# Patient Record
Sex: MALE | Race: WHITE | NOT HISPANIC OR LATINO | Employment: OTHER | URBAN - NONMETROPOLITAN AREA
[De-identification: names, ages, dates, MRNs, and addresses within clinical notes are randomized per-mention and may not be internally consistent; named-entity substitution may affect disease eponyms.]

---

## 2024-02-20 ENCOUNTER — OFFICE VISIT (OUTPATIENT)
Dept: PRIMARY CARE | Facility: CLINIC | Age: 69
End: 2024-02-20
Payer: MEDICARE

## 2024-02-20 VITALS
DIASTOLIC BLOOD PRESSURE: 80 MMHG | OXYGEN SATURATION: 98 % | HEIGHT: 68 IN | HEART RATE: 58 BPM | WEIGHT: 176.5 LBS | SYSTOLIC BLOOD PRESSURE: 120 MMHG | BODY MASS INDEX: 26.75 KG/M2

## 2024-02-20 DIAGNOSIS — I63.81 LACUNAR STROKE (MULTI): ICD-10-CM

## 2024-02-20 DIAGNOSIS — Z12.5 SCREENING FOR PROSTATE CANCER: ICD-10-CM

## 2024-02-20 DIAGNOSIS — I10 MILD HTN: ICD-10-CM

## 2024-02-20 DIAGNOSIS — E78.2 HYPERLIPEMIA, MIXED: ICD-10-CM

## 2024-02-20 DIAGNOSIS — Z00.00 ROUTINE GENERAL MEDICAL EXAMINATION AT HEALTH CARE FACILITY: Primary | ICD-10-CM

## 2024-02-20 PROCEDURE — 3079F DIAST BP 80-89 MM HG: CPT | Performed by: FAMILY MEDICINE

## 2024-02-20 PROCEDURE — 1159F MED LIST DOCD IN RCRD: CPT | Performed by: FAMILY MEDICINE

## 2024-02-20 PROCEDURE — 1170F FXNL STATUS ASSESSED: CPT | Performed by: FAMILY MEDICINE

## 2024-02-20 PROCEDURE — 99213 OFFICE O/P EST LOW 20 MIN: CPT | Performed by: FAMILY MEDICINE

## 2024-02-20 PROCEDURE — 1160F RVW MEDS BY RX/DR IN RCRD: CPT | Performed by: FAMILY MEDICINE

## 2024-02-20 PROCEDURE — G0439 PPPS, SUBSEQ VISIT: HCPCS | Performed by: FAMILY MEDICINE

## 2024-02-20 PROCEDURE — 3074F SYST BP LT 130 MM HG: CPT | Performed by: FAMILY MEDICINE

## 2024-02-20 PROCEDURE — 4004F PT TOBACCO SCREEN RCVD TLK: CPT | Performed by: FAMILY MEDICINE

## 2024-02-20 RX ORDER — LATANOPROST 50 UG/ML
1 SOLUTION/ DROPS OPHTHALMIC NIGHTLY
COMMUNITY
Start: 2024-02-16

## 2024-02-20 RX ORDER — LISINOPRIL 10 MG/1
10 TABLET ORAL DAILY
COMMUNITY
End: 2024-02-20 | Stop reason: SINTOL

## 2024-02-20 RX ORDER — LOSARTAN POTASSIUM 50 MG/1
50 TABLET ORAL DAILY
Qty: 90 TABLET | Refills: 3 | Status: SHIPPED | OUTPATIENT
Start: 2024-02-20 | End: 2025-02-19

## 2024-02-20 RX ORDER — ROSUVASTATIN CALCIUM 5 MG/1
5 TABLET, COATED ORAL DAILY
Qty: 90 TABLET | Refills: 3 | Status: SHIPPED | OUTPATIENT
Start: 2024-02-20 | End: 2025-02-19

## 2024-02-20 RX ORDER — DORZOLAMIDE HYDROCHLORIDE AND TIMOLOL MALEATE 20; 5 MG/ML; MG/ML
1 SOLUTION/ DROPS OPHTHALMIC 2 TIMES DAILY
COMMUNITY
Start: 2024-02-17

## 2024-02-20 ASSESSMENT — ENCOUNTER SYMPTOMS
LIGHT-HEADEDNESS: 0
ACTIVITY CHANGE: 0
NAUSEA: 0
ABDOMINAL DISTENTION: 0
COUGH: 0
APPETITE CHANGE: 0
CONSTIPATION: 0
PALPITATIONS: 0
NUMBNESS: 0
ARTHRALGIAS: 0
DIZZINESS: 0
ABDOMINAL PAIN: 0
OCCASIONAL FEELINGS OF UNSTEADINESS: 0
RHINORRHEA: 0
DEPRESSION: 0
VOMITING: 0
DIFFICULTY URINATING: 0
DIARRHEA: 0
UNEXPECTED WEIGHT CHANGE: 0
WHEEZING: 0
TROUBLE SWALLOWING: 0
SHORTNESS OF BREATH: 0
ADENOPATHY: 0
FATIGUE: 0
HEADACHES: 0
LOSS OF SENSATION IN FEET: 0

## 2024-02-20 ASSESSMENT — PATIENT HEALTH QUESTIONNAIRE - PHQ9
1. LITTLE INTEREST OR PLEASURE IN DOING THINGS: NOT AT ALL
SUM OF ALL RESPONSES TO PHQ9 QUESTIONS 1 AND 2: 0
2. FEELING DOWN, DEPRESSED OR HOPELESS: NOT AT ALL

## 2024-02-20 ASSESSMENT — ACTIVITIES OF DAILY LIVING (ADL)
GROCERY_SHOPPING: INDEPENDENT
TAKING_MEDICATION: INDEPENDENT
MANAGING_FINANCES: INDEPENDENT
BATHING: INDEPENDENT
DOING_HOUSEWORK: INDEPENDENT
DRESSING: INDEPENDENT

## 2024-02-20 NOTE — ASSESSMENT & PLAN NOTE
Blood pressure stable, and having issues with a dry cough, try changing lisinopril to losartan 50 mg once a day recheck again in 1 month.

## 2024-02-20 NOTE — ASSESSMENT & PLAN NOTE
Need to be taking a statin due to history of TIA, try rosuvastatin 5 mg once a day, was on 20 mg atorvastatin before, patient self discontinued.

## 2024-02-20 NOTE — ASSESSMENT & PLAN NOTE
Patient self discontinued his aspirin due to GI side effect, also had stopped his statin, retry statin rosuvastatin 5 mg once a day, advised about the importance of taking baby aspirin, recheck again in 1 month.

## 2024-02-20 NOTE — PROGRESS NOTES
"Subjective   Reason for Visit: Endy Rivera is an 68 y.o. male here for a Medicare Wellness visit.     Past Medical, Surgical, and Family History reviewed and updated in chart.    Reviewed all medications by prescribing practitioner or clinical pharmacist (such as prescriptions, OTCs, herbal therapies and supplements) and documented in the medical record.    HPI  No headache, chest pain, shortness of breath, dizziness, lightheadedness, or edema  2010 had TIA, stopped taking ASA (upset stomach), no NSAID use  Had been on statins in the past (had some side effects)  Sees Opthalmology on a regular basis  No urine or bowel issues    Patient Care Team:  Cam Mike MD as PCP - General  Cam Mike MD as PCP - [employer] PCP     Review of Systems   Constitutional:  Negative for activity change, appetite change, fatigue and unexpected weight change.   HENT:  Negative for ear pain, nosebleeds, rhinorrhea, sneezing and trouble swallowing.    Respiratory:  Negative for cough, shortness of breath and wheezing.    Cardiovascular:  Negative for chest pain, palpitations and leg swelling.   Gastrointestinal:  Negative for abdominal distention, abdominal pain, constipation, diarrhea, nausea and vomiting.   Genitourinary:  Negative for difficulty urinating.   Musculoskeletal:  Negative for arthralgias.   Skin:  Negative for rash.   Neurological:  Negative for dizziness, light-headedness, numbness and headaches.   Hematological:  Negative for adenopathy.   Psychiatric/Behavioral:  Negative for behavioral problems.    All other systems reviewed and are negative.      Objective   Vitals:  /80   Pulse 58   Ht 1.727 m (5' 8\")   Wt 80.1 kg (176 lb 8 oz)   SpO2 98%   BMI 26.84 kg/m²       Physical Exam  Vitals and nursing note reviewed.   Constitutional:       General: He is not in acute distress.     Appearance: Normal appearance. He is not toxic-appearing.   HENT:      Head: Normocephalic and " atraumatic.      Right Ear: Tympanic membrane, ear canal and external ear normal.      Left Ear: Tympanic membrane, ear canal and external ear normal.      Nose: Nose normal.      Mouth/Throat:      Mouth: Mucous membranes are moist.      Pharynx: Oropharynx is clear.   Eyes:      Extraocular Movements: Extraocular movements intact.      Conjunctiva/sclera: Conjunctivae normal.      Pupils: Pupils are equal, round, and reactive to light.   Cardiovascular:      Rate and Rhythm: Normal rate and regular rhythm.      Pulses: Normal pulses.      Heart sounds: Normal heart sounds.   Pulmonary:      Effort: Pulmonary effort is normal.      Breath sounds: Normal breath sounds.   Abdominal:      General: Abdomen is flat. Bowel sounds are normal.      Palpations: Abdomen is soft.   Musculoskeletal:      Cervical back: Normal range of motion and neck supple.   Skin:     General: Skin is warm and dry.      Capillary Refill: Capillary refill takes less than 2 seconds.   Neurological:      General: No focal deficit present.      Mental Status: He is alert and oriented to person, place, and time. Mental status is at baseline.   Psychiatric:         Mood and Affect: Mood normal.         Behavior: Behavior normal.         Assessment/Plan   Problem List Items Addressed This Visit       Hyperlipemia, mixed    Current Assessment & Plan     Need to be taking a statin due to history of TIA, try rosuvastatin 5 mg once a day, was on 20 mg atorvastatin before, patient self discontinued.         Relevant Medications    rosuvastatin (Crestor) 5 mg tablet    Other Relevant Orders    Follow Up In Primary Care - Established    Comprehensive Metabolic Panel    Lipid Panel    Lacunar stroke (CMS/HCC)    Current Assessment & Plan     Patient self discontinued his aspirin due to GI side effect, also had stopped his statin, retry statin rosuvastatin 5 mg once a day, advised about the importance of taking baby aspirin, recheck again in 1 month.          Relevant Medications    rosuvastatin (Crestor) 5 mg tablet    Other Relevant Orders    Follow Up In Primary Care - Established    Comprehensive Metabolic Panel    Lipid Panel    Mild HTN    Current Assessment & Plan     Blood pressure stable, and having issues with a dry cough, try changing lisinopril to losartan 50 mg once a day recheck again in 1 month.         Relevant Medications    losartan (Cozaar) 50 mg tablet    Other Relevant Orders    Follow Up In Primary Care - Established    Comprehensive Metabolic Panel     Other Visit Diagnoses       Routine general medical examination at health care facility    -  Primary    Relevant Orders    Follow Up In Primary Care - Established    Screening for prostate cancer        Relevant Orders    Follow Up In Primary Care - Established    Prostate Specific Antigen, Screen

## 2024-05-02 ENCOUNTER — LAB (OUTPATIENT)
Dept: LAB | Facility: LAB | Age: 69
End: 2024-05-02
Payer: MEDICARE

## 2024-05-02 DIAGNOSIS — Z12.5 SCREENING FOR PROSTATE CANCER: ICD-10-CM

## 2024-05-02 DIAGNOSIS — I63.81 LACUNAR STROKE (MULTI): ICD-10-CM

## 2024-05-02 DIAGNOSIS — E78.2 HYPERLIPEMIA, MIXED: ICD-10-CM

## 2024-05-02 DIAGNOSIS — I10 MILD HTN: ICD-10-CM

## 2024-05-02 LAB
ALBUMIN SERPL BCP-MCNC: 4.5 G/DL (ref 3.4–5)
ALP SERPL-CCNC: 70 U/L (ref 33–136)
ALT SERPL W P-5'-P-CCNC: 18 U/L (ref 10–52)
ANION GAP SERPL CALC-SCNC: 12 MMOL/L (ref 10–20)
AST SERPL W P-5'-P-CCNC: 18 U/L (ref 9–39)
BILIRUB SERPL-MCNC: 1 MG/DL (ref 0–1.2)
BUN SERPL-MCNC: 20 MG/DL (ref 6–23)
CALCIUM SERPL-MCNC: 10.1 MG/DL (ref 8.6–10.3)
CHLORIDE SERPL-SCNC: 105 MMOL/L (ref 98–107)
CHOLEST SERPL-MCNC: 190 MG/DL (ref 0–199)
CHOLESTEROL/HDL RATIO: 3.1
CO2 SERPL-SCNC: 27 MMOL/L (ref 21–32)
CREAT SERPL-MCNC: 0.89 MG/DL (ref 0.5–1.3)
EGFRCR SERPLBLD CKD-EPI 2021: >90 ML/MIN/1.73M*2
GLUCOSE SERPL-MCNC: 91 MG/DL (ref 74–99)
HDLC SERPL-MCNC: 62 MG/DL
LDLC SERPL CALC-MCNC: 108 MG/DL
NON HDL CHOLESTEROL: 128 MG/DL (ref 0–149)
POTASSIUM SERPL-SCNC: 4.5 MMOL/L (ref 3.5–5.3)
PROT SERPL-MCNC: 6.6 G/DL (ref 6.4–8.2)
PSA SERPL-MCNC: 2.25 NG/ML
SODIUM SERPL-SCNC: 139 MMOL/L (ref 136–145)
TRIGL SERPL-MCNC: 101 MG/DL (ref 0–149)
VLDL: 20 MG/DL (ref 0–40)

## 2024-05-02 PROCEDURE — 80061 LIPID PANEL: CPT

## 2024-05-02 PROCEDURE — 36415 COLL VENOUS BLD VENIPUNCTURE: CPT

## 2024-05-02 PROCEDURE — 80053 COMPREHEN METABOLIC PANEL: CPT

## 2024-05-02 PROCEDURE — G0103 PSA SCREENING: HCPCS

## 2024-05-17 ENCOUNTER — OFFICE VISIT (OUTPATIENT)
Dept: PRIMARY CARE | Facility: CLINIC | Age: 69
End: 2024-05-17
Payer: MEDICARE

## 2024-05-17 VITALS
HEIGHT: 68 IN | OXYGEN SATURATION: 98 % | WEIGHT: 173.6 LBS | DIASTOLIC BLOOD PRESSURE: 70 MMHG | BODY MASS INDEX: 26.31 KG/M2 | HEART RATE: 55 BPM | SYSTOLIC BLOOD PRESSURE: 110 MMHG

## 2024-05-17 DIAGNOSIS — I10 MILD HTN: ICD-10-CM

## 2024-05-17 DIAGNOSIS — E78.2 HYPERLIPEMIA, MIXED: ICD-10-CM

## 2024-05-17 DIAGNOSIS — Z12.5 SCREENING FOR PROSTATE CANCER: ICD-10-CM

## 2024-05-17 DIAGNOSIS — I63.81 LACUNAR STROKE (MULTI): ICD-10-CM

## 2024-05-17 PROCEDURE — 3078F DIAST BP <80 MM HG: CPT | Performed by: FAMILY MEDICINE

## 2024-05-17 PROCEDURE — 1160F RVW MEDS BY RX/DR IN RCRD: CPT | Performed by: FAMILY MEDICINE

## 2024-05-17 PROCEDURE — 1159F MED LIST DOCD IN RCRD: CPT | Performed by: FAMILY MEDICINE

## 2024-05-17 PROCEDURE — 3074F SYST BP LT 130 MM HG: CPT | Performed by: FAMILY MEDICINE

## 2024-05-17 PROCEDURE — 99213 OFFICE O/P EST LOW 20 MIN: CPT | Performed by: FAMILY MEDICINE

## 2024-05-17 PROCEDURE — 4004F PT TOBACCO SCREEN RCVD TLK: CPT | Performed by: FAMILY MEDICINE

## 2024-05-17 ASSESSMENT — ENCOUNTER SYMPTOMS
ACTIVITY CHANGE: 0
UNEXPECTED WEIGHT CHANGE: 0
DIZZINESS: 0
VOMITING: 0
TROUBLE SWALLOWING: 0
RHINORRHEA: 0
NUMBNESS: 0
PALPITATIONS: 0
CONSTIPATION: 0
WHEEZING: 0
ADENOPATHY: 0
HEADACHES: 0
LIGHT-HEADEDNESS: 0
NAUSEA: 0
FATIGUE: 0
ABDOMINAL PAIN: 0
SHORTNESS OF BREATH: 0
ABDOMINAL DISTENTION: 0
APPETITE CHANGE: 0
DIARRHEA: 0
DIFFICULTY URINATING: 0
COUGH: 0
ARTHRALGIAS: 0

## 2024-05-17 NOTE — PROGRESS NOTES
"Subjective   Patient ID: Endy Rivera is a 68 y.o. male who presents for 3 MO LABS.    HPI   No headache, chest pain, shortness of breath, dizziness, lightheadedness, or edema  Cough better, tolerating statin    Review of Systems   Constitutional:  Negative for activity change, appetite change, fatigue and unexpected weight change.   HENT:  Negative for ear pain, nosebleeds, rhinorrhea, sneezing and trouble swallowing.    Respiratory:  Negative for cough, shortness of breath and wheezing.    Cardiovascular:  Negative for chest pain, palpitations and leg swelling.   Gastrointestinal:  Negative for abdominal distention, abdominal pain, constipation, diarrhea, nausea and vomiting.   Genitourinary:  Negative for difficulty urinating.   Musculoskeletal:  Negative for arthralgias.   Skin:  Negative for rash.   Neurological:  Negative for dizziness, light-headedness, numbness and headaches.   Hematological:  Negative for adenopathy.   Psychiatric/Behavioral:  Negative for behavioral problems.    All other systems reviewed and are negative.      Objective   /70   Pulse 55   Ht 1.727 m (5' 8\")   Wt 78.7 kg (173 lb 9.6 oz)   SpO2 98%   BMI 26.40 kg/m²     Physical Exam  Vitals and nursing note reviewed.   Constitutional:       Appearance: Normal appearance.   HENT:      Head: Normocephalic and atraumatic.      Right Ear: Tympanic membrane, ear canal and external ear normal.      Left Ear: Tympanic membrane, ear canal and external ear normal.      Nose: Nose normal.      Mouth/Throat:      Mouth: Mucous membranes are moist.      Pharynx: Oropharynx is clear.   Cardiovascular:      Rate and Rhythm: Normal rate and regular rhythm.      Pulses: Normal pulses.      Heart sounds: Normal heart sounds.   Pulmonary:      Effort: Pulmonary effort is normal.      Breath sounds: Normal breath sounds.   Musculoskeletal:      Cervical back: Normal range of motion and neck supple.   Neurological:      Mental Status: He is " alert.   Psychiatric:         Mood and Affect: Mood normal.         Behavior: Behavior normal.         Assessment/Plan   Problem List Items Addressed This Visit             ICD-10-CM    Hyperlipemia, mixed E78.2     Tolerating low-dose statin, cholesterol improved, continue with current medication.         Relevant Orders    Follow Up In Primary Care - Established    Comprehensive Metabolic Panel    Lipid Panel    Lacunar stroke (Multi) I63.81     No residual effects, tolerating statin and blood pressure medication, had trouble tolerating aspirin due to stomach upset, no change.         Relevant Orders    Follow Up In Primary Care - Established    Mild HTN I10     Blood pressure stable tolerating losartan, cough improved, no change.         Relevant Orders    Follow Up In Primary Care - Established    Comprehensive Metabolic Panel     Other Visit Diagnoses         Codes    Screening for prostate cancer     Z12.5    Relevant Orders    Follow Up In Primary Care - Established    Prostate Specific Antigen, Screen

## 2024-05-17 NOTE — ASSESSMENT & PLAN NOTE
No residual effects, tolerating statin and blood pressure medication, had trouble tolerating aspirin due to stomach upset, no change.

## 2025-02-08 DIAGNOSIS — I63.81 LACUNAR STROKE (MULTI): ICD-10-CM

## 2025-02-08 DIAGNOSIS — E78.2 HYPERLIPEMIA, MIXED: ICD-10-CM

## 2025-02-08 DIAGNOSIS — I10 MILD HTN: ICD-10-CM

## 2025-05-20 ENCOUNTER — APPOINTMENT (OUTPATIENT)
Dept: PRIMARY CARE | Facility: CLINIC | Age: 70
End: 2025-05-20
Payer: MEDICARE

## 2025-05-20 VITALS
BODY MASS INDEX: 26.28 KG/M2 | OXYGEN SATURATION: 97 % | HEART RATE: 62 BPM | SYSTOLIC BLOOD PRESSURE: 140 MMHG | HEIGHT: 68 IN | DIASTOLIC BLOOD PRESSURE: 90 MMHG | WEIGHT: 173.4 LBS

## 2025-05-20 DIAGNOSIS — I63.81 LACUNAR STROKE (MULTI): ICD-10-CM

## 2025-05-20 DIAGNOSIS — G62.9 NEUROPATHY: ICD-10-CM

## 2025-05-20 DIAGNOSIS — Z00.00 ROUTINE GENERAL MEDICAL EXAMINATION AT HEALTH CARE FACILITY: Primary | ICD-10-CM

## 2025-05-20 DIAGNOSIS — E78.2 HYPERLIPEMIA, MIXED: ICD-10-CM

## 2025-05-20 DIAGNOSIS — I10 MILD HTN: ICD-10-CM

## 2025-05-20 DIAGNOSIS — Z12.5 SCREENING FOR PROSTATE CANCER: ICD-10-CM

## 2025-05-20 DIAGNOSIS — M25.50 ARTHRALGIA, UNSPECIFIED JOINT: ICD-10-CM

## 2025-05-20 PROCEDURE — 1123F ACP DISCUSS/DSCN MKR DOCD: CPT | Performed by: FAMILY MEDICINE

## 2025-05-20 PROCEDURE — G0439 PPPS, SUBSEQ VISIT: HCPCS | Performed by: FAMILY MEDICINE

## 2025-05-20 PROCEDURE — 3077F SYST BP >= 140 MM HG: CPT | Performed by: FAMILY MEDICINE

## 2025-05-20 PROCEDURE — 1170F FXNL STATUS ASSESSED: CPT | Performed by: FAMILY MEDICINE

## 2025-05-20 PROCEDURE — 99214 OFFICE O/P EST MOD 30 MIN: CPT | Performed by: FAMILY MEDICINE

## 2025-05-20 PROCEDURE — 1160F RVW MEDS BY RX/DR IN RCRD: CPT | Performed by: FAMILY MEDICINE

## 2025-05-20 PROCEDURE — 1159F MED LIST DOCD IN RCRD: CPT | Performed by: FAMILY MEDICINE

## 2025-05-20 PROCEDURE — 3008F BODY MASS INDEX DOCD: CPT | Performed by: FAMILY MEDICINE

## 2025-05-20 PROCEDURE — 3080F DIAST BP >= 90 MM HG: CPT | Performed by: FAMILY MEDICINE

## 2025-05-20 PROCEDURE — 1158F ADVNC CARE PLAN TLK DOCD: CPT | Performed by: FAMILY MEDICINE

## 2025-05-20 RX ORDER — ROSUVASTATIN CALCIUM 5 MG/1
5 TABLET, COATED ORAL DAILY
Qty: 90 TABLET | Refills: 3 | Status: SHIPPED | OUTPATIENT
Start: 2025-05-20 | End: 2026-05-20

## 2025-05-20 RX ORDER — LOSARTAN POTASSIUM 50 MG/1
50 TABLET ORAL DAILY
Qty: 90 TABLET | Refills: 3 | Status: SHIPPED | OUTPATIENT
Start: 2025-05-20 | End: 2026-05-20

## 2025-05-20 RX ORDER — ROSUVASTATIN CALCIUM 5 MG/1
5 TABLET, COATED ORAL DAILY
Qty: 90 TABLET | Refills: 3 | OUTPATIENT
Start: 2025-05-20

## 2025-05-20 RX ORDER — LOSARTAN POTASSIUM 50 MG/1
50 TABLET ORAL DAILY
Qty: 90 TABLET | Refills: 3 | OUTPATIENT
Start: 2025-05-20

## 2025-05-20 ASSESSMENT — ENCOUNTER SYMPTOMS
APPETITE CHANGE: 0
ACTIVITY CHANGE: 0
DIARRHEA: 0
VOMITING: 0
LOSS OF SENSATION IN FEET: 0
LIGHT-HEADEDNESS: 0
ABDOMINAL PAIN: 0
TROUBLE SWALLOWING: 0
WHEEZING: 0
ARTHRALGIAS: 1
CONSTIPATION: 0
OCCASIONAL FEELINGS OF UNSTEADINESS: 0
DIFFICULTY URINATING: 0
HEADACHES: 0
DIZZINESS: 0
FATIGUE: 1
COUGH: 0
RHINORRHEA: 0
SHORTNESS OF BREATH: 0
DEPRESSION: 0
NAUSEA: 0
NUMBNESS: 1
UNEXPECTED WEIGHT CHANGE: 0
PALPITATIONS: 0
ABDOMINAL DISTENTION: 0
ADENOPATHY: 0
WEAKNESS: 0

## 2025-05-20 ASSESSMENT — ACTIVITIES OF DAILY LIVING (ADL)
TAKING_MEDICATION: INDEPENDENT
MANAGING_FINANCES: INDEPENDENT
DRESSING: INDEPENDENT
BATHING: INDEPENDENT
DOING_HOUSEWORK: INDEPENDENT
GROCERY_SHOPPING: INDEPENDENT

## 2025-05-20 ASSESSMENT — PATIENT HEALTH QUESTIONNAIRE - PHQ9
1. LITTLE INTEREST OR PLEASURE IN DOING THINGS: NOT AT ALL
2. FEELING DOWN, DEPRESSED OR HOPELESS: SEVERAL DAYS
SUM OF ALL RESPONSES TO PHQ9 QUESTIONS 1 AND 2: 1
10. IF YOU CHECKED OFF ANY PROBLEMS, HOW DIFFICULT HAVE THESE PROBLEMS MADE IT FOR YOU TO DO YOUR WORK, TAKE CARE OF THINGS AT HOME, OR GET ALONG WITH OTHER PEOPLE: NOT DIFFICULT AT ALL

## 2025-05-20 NOTE — ASSESSMENT & PLAN NOTE
Blood pressure elevated, restart losartan, recheck again in 1 month.  Orders:    Follow Up In Primary Care - Established    losartan (Cozaar) 50 mg tablet; Take 1 tablet (50 mg) by mouth once daily.    Follow Up In Primary Care - Established; Future    Comprehensive Metabolic Panel; Future

## 2025-05-20 NOTE — PROGRESS NOTES
"Subjective   Reason for Visit: Endy Rivera is an 69 y.o. male here for a Medicare Wellness visit.     Past Medical, Surgical, and Family History reviewed and updated in chart.    Reviewed all medications by prescribing practitioner or clinical pharmacist (such as prescriptions, OTCs, herbal therapies and supplements) and documented in the medical record.    HPI  No headache, chest pain, shortness of breath, dizziness, lightheadedness, or edema  Missed labs and has been off of medicine for the past month  No GI/ issues  Sees Optho on a regular basis, had retinal surgery in the past year    Patient Care Team:  Cam Mike MD as PCP - General (Family Medicine)  Cam Mike MD as PCP - [employer] PCP     Review of Systems   Constitutional:  Positive for fatigue. Negative for activity change, appetite change and unexpected weight change.   HENT:  Negative for ear pain, nosebleeds, rhinorrhea, sneezing and trouble swallowing.    Respiratory:  Negative for cough, shortness of breath and wheezing.    Cardiovascular:  Negative for chest pain, palpitations and leg swelling.   Gastrointestinal:  Negative for abdominal distention, abdominal pain, constipation, diarrhea, nausea and vomiting.   Genitourinary:  Negative for difficulty urinating.   Musculoskeletal:  Positive for arthralgias.   Skin:  Negative for rash.   Neurological:  Positive for numbness. Negative for dizziness, weakness, light-headedness and headaches.   Hematological:  Negative for adenopathy.   Psychiatric/Behavioral:  Negative for behavioral problems.    All other systems reviewed and are negative.      Objective   Vitals:  /90   Pulse 62   Ht 1.727 m (5' 8\")   Wt 78.7 kg (173 lb 6.4 oz)   SpO2 97%   BMI 26.37 kg/m²       Physical Exam  Vitals and nursing note reviewed.   Constitutional:       General: He is not in acute distress.     Appearance: Normal appearance. He is not toxic-appearing.   HENT:      Head: " Normocephalic and atraumatic.      Right Ear: Tympanic membrane, ear canal and external ear normal.      Left Ear: Tympanic membrane, ear canal and external ear normal.      Nose: Nose normal.      Mouth/Throat:      Mouth: Mucous membranes are moist.      Pharynx: Oropharynx is clear.   Eyes:      Extraocular Movements: Extraocular movements intact.      Conjunctiva/sclera: Conjunctivae normal.      Pupils: Pupils are equal, round, and reactive to light.   Cardiovascular:      Rate and Rhythm: Normal rate and regular rhythm.      Pulses: Normal pulses.      Heart sounds: Normal heart sounds.   Pulmonary:      Effort: Pulmonary effort is normal.      Breath sounds: Normal breath sounds.   Abdominal:      General: Abdomen is flat. Bowel sounds are normal.      Palpations: Abdomen is soft.   Musculoskeletal:      Cervical back: Normal range of motion and neck supple.      Comments: Negative Tinel's and Phalen signs of both hands, patient does have some stiffness and swelling noted in his PIP DIP of both hands.   Skin:     General: Skin is warm and dry.      Capillary Refill: Capillary refill takes less than 2 seconds.   Neurological:      General: No focal deficit present.      Mental Status: He is alert and oriented to person, place, and time. Mental status is at baseline.   Psychiatric:         Mood and Affect: Mood normal.         Behavior: Behavior normal.         Assessment & Plan  Hyperlipemia, mixed  Patient had discontinued his statin a month ago, prescription had run out, renew prescription today, recheck in 1 month with blood testing.  Orders:    Follow Up In Primary Care - Established    rosuvastatin (Crestor) 5 mg tablet; Take 1 tablet (5 mg) by mouth once daily.    Follow Up In Primary Care - Established; Future    Comprehensive Metabolic Panel; Future    Lipid Panel; Future    Lacunar stroke (Multi)  Needs to restart cholesterol medication blood pressure medicine, no signs or symptoms of current  issues.  Orders:    Follow Up In Primary Care - Established    rosuvastatin (Crestor) 5 mg tablet; Take 1 tablet (5 mg) by mouth once daily.    Follow Up In Primary Care - Established; Future    Mild HTN  Blood pressure elevated, restart losartan, recheck again in 1 month.  Orders:    Follow Up In Primary Care - Established    losartan (Cozaar) 50 mg tablet; Take 1 tablet (50 mg) by mouth once daily.    Follow Up In Primary Care - Established; Future    Comprehensive Metabolic Panel; Future    Routine general medical examination at health care facility    Orders:    Follow Up In Primary Care - Established; Future    Screening for prostate cancer    Orders:    Follow Up In Primary Care - Established; Future    Prostate Specific Antigen, Screen; Future    Arthralgia, unspecified joint    Orders:    CBC; Future    Rheumatoid Factor; Future    Citrulline Antibody, IgG; Future    Sedimentation Rate; Future    C-Reactive Protein; Future    KENNEDI with Reflex to EDGAR; Future    Neuropathy    Orders:    CBC; Future    Vitamin B12; Future    Check rheumatologic blood testing, check vitamin B12 level, recheck again in 1 month.

## 2025-05-20 NOTE — ASSESSMENT & PLAN NOTE
Needs to restart cholesterol medication blood pressure medicine, no signs or symptoms of current issues.  Orders:    Follow Up In Primary Care - Established    rosuvastatin (Crestor) 5 mg tablet; Take 1 tablet (5 mg) by mouth once daily.    Follow Up In Primary Care - Established; Future

## 2025-05-20 NOTE — ASSESSMENT & PLAN NOTE
Patient had discontinued his statin a month ago, prescription had run out, renew prescription today, recheck in 1 month with blood testing.  Orders:    Follow Up In Primary Care - Established    rosuvastatin (Crestor) 5 mg tablet; Take 1 tablet (5 mg) by mouth once daily.    Follow Up In Primary Care - Established; Future    Comprehensive Metabolic Panel; Future    Lipid Panel; Future

## 2025-06-11 LAB
ALBUMIN SERPL-MCNC: 4.4 G/DL (ref 3.6–5.1)
ALP SERPL-CCNC: 55 U/L (ref 35–144)
ALT SERPL-CCNC: 16 U/L (ref 9–46)
ANION GAP SERPL CALCULATED.4IONS-SCNC: 7 MMOL/L (CALC) (ref 7–17)
AST SERPL-CCNC: 14 U/L (ref 10–35)
BILIRUB SERPL-MCNC: 0.7 MG/DL (ref 0.2–1.2)
BUN SERPL-MCNC: 17 MG/DL (ref 7–25)
CALCIUM SERPL-MCNC: 9.6 MG/DL (ref 8.6–10.3)
CHLORIDE SERPL-SCNC: 107 MMOL/L (ref 98–110)
CHOLEST SERPL-MCNC: 179 MG/DL
CHOLEST/HDLC SERPL: 2.9 (CALC)
CO2 SERPL-SCNC: 27 MMOL/L (ref 20–32)
CREAT SERPL-MCNC: 0.84 MG/DL (ref 0.7–1.28)
EGFRCR SERPLBLD CKD-EPI 2021: 93 ML/MIN/1.73M2
GLUCOSE SERPL-MCNC: 90 MG/DL (ref 65–99)
HDLC SERPL-MCNC: 62 MG/DL
LDLC SERPL CALC-MCNC: 98 MG/DL (CALC)
NONHDLC SERPL-MCNC: 117 MG/DL (CALC)
POTASSIUM SERPL-SCNC: 4.3 MMOL/L (ref 3.5–5.3)
PROT SERPL-MCNC: 6.4 G/DL (ref 6.1–8.1)
PSA SERPL-MCNC: 2.08 NG/ML
SODIUM SERPL-SCNC: 141 MMOL/L (ref 135–146)
TRIGL SERPL-MCNC: 97 MG/DL

## 2025-06-12 LAB
ANA PAT SER IF-IMP: ABNORMAL
ANA PAT SER IF-IMP: ABNORMAL
ANA SER QL IF: POSITIVE
ANA TITR SER IF: ABNORMAL TITER
ANA TITR SER IF: ABNORMAL TITER
CCP IGG SERPL-ACNC: <16 UNITS
CENTROMERE B AB SER-ACNC: ABNORMAL AI
CRP SERPL-MCNC: <3 MG/L
DSDNA AB SER-ACNC: <1 IU/ML
ENA JO1 AB SER IA-ACNC: ABNORMAL AI
ENA RNP AB SER-ACNC: ABNORMAL AI
ENA SCL70 AB SER IA-ACNC: ABNORMAL AI
ENA SM AB SER IA-ACNC: ABNORMAL AI
ENA SM+RNP AB SER IA-ACNC: ABNORMAL AI
ENA SS-A AB SER IA-ACNC: ABNORMAL AI
ENA SS-B AB SER IA-ACNC: ABNORMAL AI
ERYTHROCYTE [DISTWIDTH] IN BLOOD BY AUTOMATED COUNT: 13.5 % (ref 11–15)
ERYTHROCYTE [SEDIMENTATION RATE] IN BLOOD BY WESTERGREN METHOD: 2 MM/H
HCT VFR BLD AUTO: 44 % (ref 38.5–50)
HGB BLD-MCNC: 14.5 G/DL (ref 13.2–17.1)
LABORATORY COMMENT REPORT: ABNORMAL
MCH RBC QN AUTO: 32 PG (ref 27–33)
MCHC RBC AUTO-ENTMCNC: 33 G/DL (ref 32–36)
MCV RBC AUTO: 97.1 FL (ref 80–100)
NUCLEOSOME AB SER IA-ACNC: ABNORMAL AI
PLATELET # BLD AUTO: 258 THOUSAND/UL (ref 140–400)
PMV BLD REES-ECKER: 8.8 FL (ref 7.5–12.5)
RBC # BLD AUTO: 4.53 MILLION/UL (ref 4.2–5.8)
RHEUMATOID FACT SERPL-ACNC: <10 IU/ML
RIBOSOMAL P AB SER-ACNC: ABNORMAL AI
VIT B12 SERPL-MCNC: 375 PG/ML (ref 200–1100)
WBC # BLD AUTO: 5 THOUSAND/UL (ref 3.8–10.8)

## 2025-06-20 ENCOUNTER — APPOINTMENT (OUTPATIENT)
Age: 70
End: 2025-06-20
Payer: MEDICARE

## 2025-06-20 VITALS
DIASTOLIC BLOOD PRESSURE: 90 MMHG | BODY MASS INDEX: 26.1 KG/M2 | HEIGHT: 68 IN | OXYGEN SATURATION: 97 % | WEIGHT: 172.2 LBS | HEART RATE: 60 BPM | SYSTOLIC BLOOD PRESSURE: 130 MMHG

## 2025-06-20 DIAGNOSIS — E78.2 HYPERLIPEMIA, MIXED: ICD-10-CM

## 2025-06-20 DIAGNOSIS — I63.81 LACUNAR STROKE (MULTI): ICD-10-CM

## 2025-06-20 DIAGNOSIS — R76.8 ELEVATED ANTINUCLEAR ANTIBODY (ANA) LEVEL: ICD-10-CM

## 2025-06-20 DIAGNOSIS — Z12.5 SCREENING FOR PROSTATE CANCER: ICD-10-CM

## 2025-06-20 DIAGNOSIS — I10 MILD HTN: Primary | ICD-10-CM

## 2025-06-20 DIAGNOSIS — I10 MILD HTN: ICD-10-CM

## 2025-06-20 PROCEDURE — 1159F MED LIST DOCD IN RCRD: CPT | Performed by: FAMILY MEDICINE

## 2025-06-20 PROCEDURE — 3080F DIAST BP >= 90 MM HG: CPT | Performed by: FAMILY MEDICINE

## 2025-06-20 PROCEDURE — 1160F RVW MEDS BY RX/DR IN RCRD: CPT | Performed by: FAMILY MEDICINE

## 2025-06-20 PROCEDURE — 3075F SYST BP GE 130 - 139MM HG: CPT | Performed by: FAMILY MEDICINE

## 2025-06-20 PROCEDURE — 3008F BODY MASS INDEX DOCD: CPT | Performed by: FAMILY MEDICINE

## 2025-06-20 PROCEDURE — 99213 OFFICE O/P EST LOW 20 MIN: CPT | Performed by: FAMILY MEDICINE

## 2025-06-20 RX ORDER — MELOXICAM 7.5 MG/1
7.5 TABLET ORAL DAILY
Qty: 30 TABLET | Refills: 11 | Status: SHIPPED | OUTPATIENT
Start: 2025-06-20 | End: 2026-06-20

## 2025-06-20 ASSESSMENT — ENCOUNTER SYMPTOMS
ADENOPATHY: 0
VOMITING: 0
DIZZINESS: 0
NUMBNESS: 0
APPETITE CHANGE: 0
RHINORRHEA: 0
HEADACHES: 0
PALPITATIONS: 0
ABDOMINAL PAIN: 0
TROUBLE SWALLOWING: 0
ARTHRALGIAS: 1
CONSTIPATION: 0
ABDOMINAL DISTENTION: 0
DIFFICULTY URINATING: 0
NAUSEA: 0
UNEXPECTED WEIGHT CHANGE: 0
FATIGUE: 0
WHEEZING: 0
LIGHT-HEADEDNESS: 0
ACTIVITY CHANGE: 0
SHORTNESS OF BREATH: 0
COUGH: 0
DIARRHEA: 0

## 2025-06-20 NOTE — PROGRESS NOTES
"Subjective   Patient ID: Endy Rivera is a 69 y.o. male who presents for 1 MO LABS.    HPI   No headache, chest pain, shortness of breath, dizziness, lightheadedness, or edema  Different joint pains, comes and goes, AM the worse    Review of Systems   Constitutional:  Negative for activity change, appetite change, fatigue and unexpected weight change.   HENT:  Negative for ear pain, nosebleeds, rhinorrhea, sneezing and trouble swallowing.    Respiratory:  Negative for cough, shortness of breath and wheezing.    Cardiovascular:  Negative for chest pain, palpitations and leg swelling.   Gastrointestinal:  Negative for abdominal distention, abdominal pain, constipation, diarrhea, nausea and vomiting.   Genitourinary:  Negative for difficulty urinating.   Musculoskeletal:  Positive for arthralgias.   Skin:  Negative for rash.   Neurological:  Negative for dizziness, light-headedness, numbness and headaches.   Hematological:  Negative for adenopathy.   Psychiatric/Behavioral:  Negative for behavioral problems.    All other systems reviewed and are negative.      Objective   /90   Pulse 60   Ht 1.727 m (5' 8\")   Wt 78.1 kg (172 lb 3.2 oz)   SpO2 97%   BMI 26.18 kg/m²     Physical Exam  Vitals and nursing note reviewed.   Constitutional:       Appearance: Normal appearance.   HENT:      Head: Normocephalic and atraumatic.      Right Ear: Tympanic membrane, ear canal and external ear normal.      Left Ear: Tympanic membrane, ear canal and external ear normal.      Nose: Nose normal.      Mouth/Throat:      Mouth: Mucous membranes are moist.      Pharynx: Oropharynx is clear.   Cardiovascular:      Rate and Rhythm: Normal rate and regular rhythm.      Pulses: Normal pulses.      Heart sounds: Normal heart sounds.   Pulmonary:      Effort: Pulmonary effort is normal.      Breath sounds: Normal breath sounds.   Musculoskeletal:      Cervical back: Normal range of motion and neck supple.   Skin:     General: Skin " is warm and dry.      Capillary Refill: Capillary refill takes less than 2 seconds.   Neurological:      Mental Status: He is alert.   Psychiatric:         Mood and Affect: Mood normal.         Behavior: Behavior normal.         Assessment/Plan   Problem List Items Addressed This Visit           ICD-10-CM    Hyperlipemia, mixed E78.2    Tolerating rosuvastatin, laboratory testing stable.         Relevant Orders    Follow Up In Primary Care - Established    Comprehensive Metabolic Panel    Cholesterol, LDL Direct    Lacunar stroke (Multi) I63.81    Continue with blood pressure medication and rosuvastatin, recommend baby aspirin         Relevant Orders    Follow Up In Primary Care - Established    Mild HTN - Primary I10    Blood pressure under better control, renal function stable no change         Relevant Orders    Follow Up In Primary Care - Established    Comprehensive Metabolic Panel     Other Visit Diagnoses         Codes      Screening for prostate cancer     Z12.5    Relevant Orders    Follow Up In Primary Care - Established      Elevated antinuclear antibody (KENNEDI) level     R76.8    Try adding meloxicam 7-1/2 mg a day recheck labs at 6 months     Relevant Medications    meloxicam (Mobic) 7.5 mg tablet    Other Relevant Orders    C-Reactive Protein    KENNEDI with Reflex to EDGAR

## 2025-08-12 DIAGNOSIS — E78.2 HYPERLIPEMIA, MIXED: ICD-10-CM

## 2025-08-12 DIAGNOSIS — I10 MILD HTN: ICD-10-CM

## 2025-08-12 DIAGNOSIS — I63.81 LACUNAR STROKE (MULTI): ICD-10-CM

## 2025-08-12 RX ORDER — ROSUVASTATIN CALCIUM 5 MG/1
5 TABLET, COATED ORAL DAILY
Qty: 90 TABLET | Refills: 3 | Status: SHIPPED | OUTPATIENT
Start: 2025-08-12 | End: 2026-08-12

## 2025-08-12 RX ORDER — LOSARTAN POTASSIUM 50 MG/1
50 TABLET ORAL DAILY
Qty: 90 TABLET | Refills: 3 | Status: SHIPPED | OUTPATIENT
Start: 2025-08-12 | End: 2026-08-12

## 2025-12-18 ENCOUNTER — APPOINTMENT (OUTPATIENT)
Age: 70
End: 2025-12-18
Payer: MEDICARE